# Patient Record
Sex: MALE | Race: BLACK OR AFRICAN AMERICAN | NOT HISPANIC OR LATINO | Employment: UNEMPLOYED | URBAN - METROPOLITAN AREA
[De-identification: names, ages, dates, MRNs, and addresses within clinical notes are randomized per-mention and may not be internally consistent; named-entity substitution may affect disease eponyms.]

---

## 2019-11-12 ENCOUNTER — HOSPITAL ENCOUNTER (EMERGENCY)
Facility: MEDICAL CENTER | Age: 24
End: 2019-11-12
Attending: EMERGENCY MEDICINE
Payer: COMMERCIAL

## 2019-11-12 VITALS
HEART RATE: 89 BPM | RESPIRATION RATE: 18 BRPM | HEIGHT: 70 IN | SYSTOLIC BLOOD PRESSURE: 123 MMHG | DIASTOLIC BLOOD PRESSURE: 69 MMHG | BODY MASS INDEX: 23.67 KG/M2 | TEMPERATURE: 97.9 F | WEIGHT: 165.34 LBS | OXYGEN SATURATION: 100 %

## 2019-11-12 DIAGNOSIS — J02.9 PHARYNGITIS, UNSPECIFIED ETIOLOGY: ICD-10-CM

## 2019-11-12 PROCEDURE — A9270 NON-COVERED ITEM OR SERVICE: HCPCS | Performed by: EMERGENCY MEDICINE

## 2019-11-12 PROCEDURE — 99282 EMERGENCY DEPT VISIT SF MDM: CPT

## 2019-11-12 PROCEDURE — 700102 HCHG RX REV CODE 250 W/ 637 OVERRIDE(OP): Performed by: EMERGENCY MEDICINE

## 2019-11-12 RX ORDER — DEXAMETHASONE 4 MG/1
12 TABLET ORAL ONCE
Status: COMPLETED | OUTPATIENT
Start: 2019-11-12 | End: 2019-11-12

## 2019-11-12 RX ADMIN — DEXAMETHASONE 12 MG: 4 TABLET ORAL at 05:15

## 2019-11-12 NOTE — ED TRIAGE NOTES
"Oswaldo Acharya  24 y.o. male  Chief Complaint   Patient presents with   • Sore Throat     pt had a tooth infection, used hydrogen peroxide to clean it, and now has a sore throat       Pt amb to triage with steady gait for above complaint.   Pt is alert and oriented, speaking in full sentences, follows commands and responds appropriately to questions. NAD. Resp are even and unlabored.  Pt placed in lobby. Pt educated on triage process. Pt encouraged to alert staff for any changes.  /69   Pulse 89   Temp 36.6 °C (97.9 °F) (Oral)   Resp 18   Ht 1.778 m (5' 10\")   Wt 75 kg (165 lb 5.5 oz)   SpO2 100%   BMI 23.72 kg/m²     "

## 2019-11-12 NOTE — ED PROVIDER NOTES
"ED Provider Note    CHIEF COMPLAINT  Chief Complaint   Patient presents with   • Sore Throat     pt had a tooth infection, used hydrogen peroxide to clean it, and now has a sore throat       HPI  Oswaldo Acharya is a 24 y.o. male who presents with a sore throat.  The patient states this started yesterday.  He states he felt like he had a tooth infection use some hydrogen peroxide to clean it and now he has a sore throat.  He also some anterior cervical adenopathy.  He has had a little bit of congestion.  He has not had any fevers.  He does not have a headache.    REVIEW OF SYSTEMS  No nausea or vomiting, no rashes    PHYSICAL EXAM  VITAL SIGNS: /69   Pulse 89   Temp 36.6 °C (97.9 °F) (Oral)   Resp 18   Ht 1.778 m (5' 10\")   Wt 75 kg (165 lb 5.5 oz)   SpO2 100%   BMI 23.72 kg/m²   In general the patient does not appear toxic    Ears tympanic membranes retracted bilaterally, nares have swollen turbinates, oropharynx erythematous without exudate    Neck has anterior cervical adenopathy    Pulmonary chest clear to auscultation bilaterally    COURSE & MEDICAL DECISION MAKING  Pertinent Labs & Imaging studies reviewed. (See chart for details)  This is a 24-year-old male who presents with signs and symptoms consistent with a viral pharyngitis.  The patient will receive a one-time dose of steroids for acute inflammation.  He is instructed to drink lots of fluids and take Motrin every 8 hours.  He will return if he is acutely worse.    FINAL IMPRESSION  1.  Viral pharyngitis       Disposition  The patient will be discharged in stable condition    Electronically signed by: Gregory Bueno, 11/12/2019 4:53 AM      "

## 2019-11-12 NOTE — ED NOTES
"Pt states \"I have strep throat\". Reports ST for 4 days. Chest hurts with cough, productive cough with clear/yellow sputum.   "